# Patient Record
Sex: FEMALE | Race: WHITE | NOT HISPANIC OR LATINO | URBAN - METROPOLITAN AREA
[De-identification: names, ages, dates, MRNs, and addresses within clinical notes are randomized per-mention and may not be internally consistent; named-entity substitution may affect disease eponyms.]

---

## 2017-01-13 ENCOUNTER — COMMUNICATION - HEALTHEAST (OUTPATIENT)
Dept: INTERNAL MEDICINE | Facility: CLINIC | Age: 62
End: 2017-01-13

## 2017-01-13 DIAGNOSIS — I10 HYPERTENSION: ICD-10-CM

## 2017-01-14 ENCOUNTER — RECORDS - HEALTHEAST (OUTPATIENT)
Dept: ADMINISTRATIVE | Facility: OTHER | Age: 62
End: 2017-01-14

## 2017-04-13 ENCOUNTER — COMMUNICATION - HEALTHEAST (OUTPATIENT)
Dept: SCHEDULING | Facility: CLINIC | Age: 62
End: 2017-04-13

## 2017-05-31 ENCOUNTER — COMMUNICATION - HEALTHEAST (OUTPATIENT)
Dept: SCHEDULING | Facility: CLINIC | Age: 62
End: 2017-05-31

## 2017-05-31 ENCOUNTER — RECORDS - HEALTHEAST (OUTPATIENT)
Dept: ADMINISTRATIVE | Facility: OTHER | Age: 62
End: 2017-05-31

## 2017-06-08 ENCOUNTER — OFFICE VISIT - HEALTHEAST (OUTPATIENT)
Dept: INTERNAL MEDICINE | Facility: CLINIC | Age: 62
End: 2017-06-08

## 2017-06-08 DIAGNOSIS — I10 HTN (HYPERTENSION): ICD-10-CM

## 2017-06-08 DIAGNOSIS — M25.572 BILATERAL ANKLE PAIN: ICD-10-CM

## 2017-06-08 DIAGNOSIS — D64.9 ANEMIA: ICD-10-CM

## 2017-06-08 DIAGNOSIS — M25.571 BILATERAL ANKLE PAIN: ICD-10-CM

## 2017-06-08 DIAGNOSIS — R53.83 FATIGUE: ICD-10-CM

## 2017-06-08 DIAGNOSIS — E78.5 HYPERLIPIDEMIA: ICD-10-CM

## 2017-06-09 ENCOUNTER — COMMUNICATION - HEALTHEAST (OUTPATIENT)
Dept: INTERNAL MEDICINE | Facility: CLINIC | Age: 62
End: 2017-06-09

## 2017-06-28 ENCOUNTER — RECORDS - HEALTHEAST (OUTPATIENT)
Dept: ADMINISTRATIVE | Facility: OTHER | Age: 62
End: 2017-06-28

## 2017-08-28 ENCOUNTER — RECORDS - HEALTHEAST (OUTPATIENT)
Dept: ADMINISTRATIVE | Facility: OTHER | Age: 62
End: 2017-08-28

## 2017-08-28 ENCOUNTER — COMMUNICATION - HEALTHEAST (OUTPATIENT)
Dept: INTERNAL MEDICINE | Facility: CLINIC | Age: 62
End: 2017-08-28

## 2017-08-28 DIAGNOSIS — R94.6 ABNORMAL THYROID FUNCTION TEST: ICD-10-CM

## 2017-08-30 ENCOUNTER — AMBULATORY - HEALTHEAST (OUTPATIENT)
Dept: LAB | Facility: CLINIC | Age: 62
End: 2017-08-30

## 2017-08-30 DIAGNOSIS — R94.6 ABNORMAL THYROID FUNCTION TEST: ICD-10-CM

## 2017-09-11 ENCOUNTER — RECORDS - HEALTHEAST (OUTPATIENT)
Dept: ADMINISTRATIVE | Facility: OTHER | Age: 62
End: 2017-09-11

## 2017-10-14 ENCOUNTER — COMMUNICATION - HEALTHEAST (OUTPATIENT)
Dept: INTERNAL MEDICINE | Facility: CLINIC | Age: 62
End: 2017-10-14

## 2017-10-14 DIAGNOSIS — I10 HYPERTENSION: ICD-10-CM

## 2017-12-26 ENCOUNTER — OFFICE VISIT - HEALTHEAST (OUTPATIENT)
Dept: INTERNAL MEDICINE | Facility: CLINIC | Age: 62
End: 2017-12-26

## 2017-12-26 ENCOUNTER — COMMUNICATION - HEALTHEAST (OUTPATIENT)
Dept: INTERNAL MEDICINE | Facility: CLINIC | Age: 62
End: 2017-12-26

## 2017-12-26 DIAGNOSIS — I10 HYPERTENSION: ICD-10-CM

## 2017-12-26 DIAGNOSIS — Z01.00 EYE EXAM, ROUTINE: ICD-10-CM

## 2017-12-26 DIAGNOSIS — J32.1 CHRONIC FRONTAL SINUSITIS: ICD-10-CM

## 2017-12-26 DIAGNOSIS — K50.90 CROHN'S DISEASE WITHOUT COMPLICATION, UNSPECIFIED GASTROINTESTINAL TRACT LOCATION (H): ICD-10-CM

## 2017-12-26 DIAGNOSIS — E78.5 HYPERLIPIDEMIA: ICD-10-CM

## 2017-12-26 DIAGNOSIS — D48.5 NEOPLASM OF UNCERTAIN BEHAVIOR OF SKIN: ICD-10-CM

## 2017-12-26 DIAGNOSIS — Z00.00 ROUTINE GENERAL MEDICAL EXAMINATION AT A HEALTH CARE FACILITY: ICD-10-CM

## 2017-12-26 DIAGNOSIS — Z12.31 SCREENING MAMMOGRAM, ENCOUNTER FOR: ICD-10-CM

## 2017-12-26 LAB
CHOLEST SERPL-MCNC: 261 MG/DL
FASTING STATUS PATIENT QL REPORTED: YES
HDLC SERPL-MCNC: 44 MG/DL
LDLC SERPL CALC-MCNC: 180 MG/DL
TRIGL SERPL-MCNC: 184 MG/DL

## 2017-12-26 ASSESSMENT — MIFFLIN-ST. JEOR: SCORE: 1280.81

## 2018-01-15 ENCOUNTER — RECORDS - HEALTHEAST (OUTPATIENT)
Dept: ADMINISTRATIVE | Facility: OTHER | Age: 63
End: 2018-01-15

## 2018-01-15 ENCOUNTER — HOSPITAL ENCOUNTER (OUTPATIENT)
Dept: MAMMOGRAPHY | Facility: CLINIC | Age: 63
Discharge: HOME | End: 2018-01-15
Attending: INTERNAL MEDICINE

## 2018-01-15 DIAGNOSIS — Z12.31 SCREENING MAMMOGRAM, ENCOUNTER FOR: ICD-10-CM

## 2018-01-22 ENCOUNTER — COMMUNICATION - HEALTHEAST (OUTPATIENT)
Dept: FAMILY MEDICINE | Facility: CLINIC | Age: 63
End: 2018-01-22

## 2018-01-22 ENCOUNTER — COMMUNICATION - HEALTHEAST (OUTPATIENT)
Dept: INTERNAL MEDICINE | Facility: CLINIC | Age: 63
End: 2018-01-22

## 2018-01-22 DIAGNOSIS — K50.90 CROHN'S DISEASE (H): ICD-10-CM

## 2018-01-24 ENCOUNTER — RECORDS - HEALTHEAST (OUTPATIENT)
Dept: ADMINISTRATIVE | Facility: OTHER | Age: 63
End: 2018-01-24

## 2018-01-30 ENCOUNTER — COMMUNICATION - HEALTHEAST (OUTPATIENT)
Dept: INTERNAL MEDICINE | Facility: CLINIC | Age: 63
End: 2018-01-30

## 2018-02-12 ENCOUNTER — RECORDS - HEALTHEAST (OUTPATIENT)
Dept: ADMINISTRATIVE | Facility: OTHER | Age: 63
End: 2018-02-12

## 2018-04-05 ENCOUNTER — RECORDS - HEALTHEAST (OUTPATIENT)
Dept: ADMINISTRATIVE | Facility: OTHER | Age: 63
End: 2018-04-05

## 2018-04-16 ENCOUNTER — RECORDS - HEALTHEAST (OUTPATIENT)
Dept: ADMINISTRATIVE | Facility: OTHER | Age: 63
End: 2018-04-16

## 2018-04-17 ENCOUNTER — RECORDS - HEALTHEAST (OUTPATIENT)
Dept: ADMINISTRATIVE | Facility: OTHER | Age: 63
End: 2018-04-17

## 2018-05-15 ENCOUNTER — RECORDS - HEALTHEAST (OUTPATIENT)
Dept: ADMINISTRATIVE | Facility: OTHER | Age: 63
End: 2018-05-15

## 2018-08-20 ENCOUNTER — RECORDS - HEALTHEAST (OUTPATIENT)
Dept: ADMINISTRATIVE | Facility: OTHER | Age: 63
End: 2018-08-20

## 2018-10-31 ENCOUNTER — RECORDS - HEALTHEAST (OUTPATIENT)
Dept: ADMINISTRATIVE | Facility: OTHER | Age: 63
End: 2018-10-31

## 2018-11-05 ENCOUNTER — RECORDS - HEALTHEAST (OUTPATIENT)
Dept: ADMINISTRATIVE | Facility: OTHER | Age: 63
End: 2018-11-05

## 2018-12-20 ENCOUNTER — RECORDS - HEALTHEAST (OUTPATIENT)
Dept: ADMINISTRATIVE | Facility: OTHER | Age: 63
End: 2018-12-20

## 2018-12-21 ENCOUNTER — RECORDS - HEALTHEAST (OUTPATIENT)
Dept: ADMINISTRATIVE | Facility: OTHER | Age: 63
End: 2018-12-21

## 2019-01-06 ENCOUNTER — COMMUNICATION - HEALTHEAST (OUTPATIENT)
Dept: INTERNAL MEDICINE | Facility: CLINIC | Age: 64
End: 2019-01-06

## 2019-01-06 DIAGNOSIS — I10 HYPERTENSION: ICD-10-CM

## 2019-02-25 ENCOUNTER — RECORDS - HEALTHEAST (OUTPATIENT)
Dept: ADMINISTRATIVE | Facility: OTHER | Age: 64
End: 2019-02-25

## 2019-02-27 ENCOUNTER — RECORDS - HEALTHEAST (OUTPATIENT)
Dept: ADMINISTRATIVE | Facility: OTHER | Age: 64
End: 2019-02-27

## 2019-04-03 ENCOUNTER — COMMUNICATION - HEALTHEAST (OUTPATIENT)
Dept: INTERNAL MEDICINE | Facility: CLINIC | Age: 64
End: 2019-04-03

## 2019-04-03 DIAGNOSIS — I10 HYPERTENSION: ICD-10-CM

## 2019-04-15 ENCOUNTER — RECORDS - HEALTHEAST (OUTPATIENT)
Dept: ADMINISTRATIVE | Facility: OTHER | Age: 64
End: 2019-04-15

## 2019-05-21 ENCOUNTER — OFFICE VISIT - HEALTHEAST (OUTPATIENT)
Dept: FAMILY MEDICINE | Facility: CLINIC | Age: 64
End: 2019-05-21

## 2019-05-21 DIAGNOSIS — E78.5 HYPERLIPIDEMIA, UNSPECIFIED HYPERLIPIDEMIA TYPE: ICD-10-CM

## 2019-05-21 DIAGNOSIS — M94.9 DISORDER OF BONE AND CARTILAGE: ICD-10-CM

## 2019-05-21 DIAGNOSIS — Z12.31 VISIT FOR SCREENING MAMMOGRAM: ICD-10-CM

## 2019-05-21 DIAGNOSIS — I10 HYPERTENSION: ICD-10-CM

## 2019-05-21 DIAGNOSIS — Z00.00 ROUTINE HEALTH MAINTENANCE: ICD-10-CM

## 2019-05-21 DIAGNOSIS — K50.90 CROHN'S DISEASE WITHOUT COMPLICATION, UNSPECIFIED GASTROINTESTINAL TRACT LOCATION (H): ICD-10-CM

## 2019-05-21 DIAGNOSIS — M89.9 DISORDER OF BONE AND CARTILAGE: ICD-10-CM

## 2019-05-21 ASSESSMENT — MIFFLIN-ST. JEOR: SCORE: 1278.2

## 2019-05-22 ENCOUNTER — AMBULATORY - HEALTHEAST (OUTPATIENT)
Dept: LAB | Facility: CLINIC | Age: 64
End: 2019-05-22

## 2019-05-22 DIAGNOSIS — M94.9 DISORDER OF BONE AND CARTILAGE: ICD-10-CM

## 2019-05-22 DIAGNOSIS — I10 HYPERTENSION: ICD-10-CM

## 2019-05-22 DIAGNOSIS — M89.9 DISORDER OF BONE AND CARTILAGE: ICD-10-CM

## 2019-05-22 DIAGNOSIS — K50.90 CROHN'S DISEASE WITHOUT COMPLICATION, UNSPECIFIED GASTROINTESTINAL TRACT LOCATION (H): ICD-10-CM

## 2019-05-22 DIAGNOSIS — E78.5 HYPERLIPIDEMIA, UNSPECIFIED HYPERLIPIDEMIA TYPE: ICD-10-CM

## 2019-05-22 LAB
ALBUMIN SERPL-MCNC: 4.1 G/DL (ref 3.5–5)
ALP SERPL-CCNC: 39 U/L (ref 45–120)
ALT SERPL W P-5'-P-CCNC: 33 U/L (ref 0–45)
ANION GAP SERPL CALCULATED.3IONS-SCNC: 15 MMOL/L (ref 5–18)
AST SERPL W P-5'-P-CCNC: 30 U/L (ref 0–40)
BILIRUB SERPL-MCNC: 0.4 MG/DL (ref 0–1)
BUN SERPL-MCNC: 16 MG/DL (ref 8–22)
CALCIUM SERPL-MCNC: 9.7 MG/DL (ref 8.5–10.5)
CHLORIDE BLD-SCNC: 99 MMOL/L (ref 98–107)
CHOLEST SERPL-MCNC: 281 MG/DL
CO2 SERPL-SCNC: 27 MMOL/L (ref 22–31)
CREAT SERPL-MCNC: 0.77 MG/DL (ref 0.6–1.1)
ERYTHROCYTE [DISTWIDTH] IN BLOOD BY AUTOMATED COUNT: 10.9 % (ref 11–14.5)
FASTING STATUS PATIENT QL REPORTED: YES
GFR SERPL CREATININE-BSD FRML MDRD: >60 ML/MIN/1.73M2
GLUCOSE BLD-MCNC: 106 MG/DL (ref 70–125)
HCT VFR BLD AUTO: 39.8 % (ref 35–47)
HDLC SERPL-MCNC: 44 MG/DL
HGB BLD-MCNC: 13.3 G/DL (ref 12–16)
LDLC SERPL CALC-MCNC: 185 MG/DL
MCH RBC QN AUTO: 30.4 PG (ref 27–34)
MCHC RBC AUTO-ENTMCNC: 33.3 G/DL (ref 32–36)
MCV RBC AUTO: 91 FL (ref 80–100)
PLATELET # BLD AUTO: 275 THOU/UL (ref 140–440)
PMV BLD AUTO: 7.3 FL (ref 7–10)
POTASSIUM BLD-SCNC: 4.1 MMOL/L (ref 3.5–5)
PROT SERPL-MCNC: 8.2 G/DL (ref 6–8)
RBC # BLD AUTO: 4.37 MILL/UL (ref 3.8–5.4)
SODIUM SERPL-SCNC: 141 MMOL/L (ref 136–145)
TRIGL SERPL-MCNC: 259 MG/DL
WBC: 6.6 THOU/UL (ref 4–11)

## 2019-05-23 LAB
25(OH)D3 SERPL-MCNC: 42.3 NG/ML (ref 30–80)
25(OH)D3 SERPL-MCNC: 42.3 NG/ML (ref 30–80)

## 2019-05-29 ENCOUNTER — COMMUNICATION - HEALTHEAST (OUTPATIENT)
Dept: FAMILY MEDICINE | Facility: CLINIC | Age: 64
End: 2019-05-29

## 2019-05-29 ENCOUNTER — RECORDS - HEALTHEAST (OUTPATIENT)
Dept: ADMINISTRATIVE | Facility: OTHER | Age: 64
End: 2019-05-29

## 2019-06-10 ENCOUNTER — HOSPITAL ENCOUNTER (OUTPATIENT)
Dept: CT IMAGING | Facility: CLINIC | Age: 64
Discharge: HOME | End: 2019-06-10

## 2019-06-10 DIAGNOSIS — E78.5 HYPERLIPIDEMIA, UNSPECIFIED HYPERLIPIDEMIA TYPE: ICD-10-CM

## 2019-06-10 LAB
CV CALCIUM SCORE AGATSTON LM: 0
CV CALCIUM SCORING AGATSON LAD: 25
CV CALCIUM SCORING AGATSTON CX: 25
CV CALCIUM SCORING AGATSTON RCA: 0
CV CALCIUM SCORING AGATSTON TOTAL: 50

## 2019-06-11 ENCOUNTER — COMMUNICATION - HEALTHEAST (OUTPATIENT)
Dept: FAMILY MEDICINE | Facility: CLINIC | Age: 64
End: 2019-06-11

## 2019-07-03 ENCOUNTER — RECORDS - HEALTHEAST (OUTPATIENT)
Dept: ADMINISTRATIVE | Facility: OTHER | Age: 64
End: 2019-07-03

## 2019-07-10 ENCOUNTER — RECORDS - HEALTHEAST (OUTPATIENT)
Dept: ADMINISTRATIVE | Facility: OTHER | Age: 64
End: 2019-07-10

## 2019-07-10 ENCOUNTER — RECORDS - HEALTHEAST (OUTPATIENT)
Dept: BONE DENSITY | Facility: CLINIC | Age: 64
End: 2019-07-10

## 2019-07-10 ENCOUNTER — HOSPITAL ENCOUNTER (OUTPATIENT)
Dept: MAMMOGRAPHY | Facility: CLINIC | Age: 64
Discharge: HOME | End: 2019-07-10

## 2019-07-10 DIAGNOSIS — M89.9 DISORDER OF BONE, UNSPECIFIED: ICD-10-CM

## 2019-07-10 DIAGNOSIS — M94.9 DISORDER OF CARTILAGE, UNSPECIFIED: ICD-10-CM

## 2019-07-10 DIAGNOSIS — Z12.31 VISIT FOR SCREENING MAMMOGRAM: ICD-10-CM

## 2019-08-07 ENCOUNTER — RECORDS - HEALTHEAST (OUTPATIENT)
Dept: ADMINISTRATIVE | Facility: OTHER | Age: 64
End: 2019-08-07

## 2019-08-08 ENCOUNTER — RECORDS - HEALTHEAST (OUTPATIENT)
Dept: ADMINISTRATIVE | Facility: OTHER | Age: 64
End: 2019-08-08

## 2019-08-28 ENCOUNTER — RECORDS - HEALTHEAST (OUTPATIENT)
Dept: ADMINISTRATIVE | Facility: OTHER | Age: 64
End: 2019-08-28

## 2019-10-23 ENCOUNTER — RECORDS - HEALTHEAST (OUTPATIENT)
Dept: ADMINISTRATIVE | Facility: OTHER | Age: 64
End: 2019-10-23

## 2019-11-06 ENCOUNTER — RECORDS - HEALTHEAST (OUTPATIENT)
Dept: ADMINISTRATIVE | Facility: OTHER | Age: 64
End: 2019-11-06

## 2019-12-30 ENCOUNTER — RECORDS - HEALTHEAST (OUTPATIENT)
Dept: ADMINISTRATIVE | Facility: OTHER | Age: 64
End: 2019-12-30

## 2020-02-12 ENCOUNTER — RECORDS - HEALTHEAST (OUTPATIENT)
Dept: ADMINISTRATIVE | Facility: OTHER | Age: 65
End: 2020-02-12

## 2020-03-18 ENCOUNTER — RECORDS - HEALTHEAST (OUTPATIENT)
Dept: ADMINISTRATIVE | Facility: OTHER | Age: 65
End: 2020-03-18

## 2020-05-02 ENCOUNTER — COMMUNICATION - HEALTHEAST (OUTPATIENT)
Dept: FAMILY MEDICINE | Facility: CLINIC | Age: 65
End: 2020-05-02

## 2020-05-02 DIAGNOSIS — I10 HYPERTENSION: ICD-10-CM

## 2020-06-03 ENCOUNTER — RECORDS - HEALTHEAST (OUTPATIENT)
Dept: ADMINISTRATIVE | Facility: OTHER | Age: 65
End: 2020-06-03

## 2020-06-11 ENCOUNTER — OFFICE VISIT - HEALTHEAST (OUTPATIENT)
Dept: FAMILY MEDICINE | Facility: CLINIC | Age: 65
End: 2020-06-11

## 2020-06-11 DIAGNOSIS — I10 HYPERTENSION: ICD-10-CM

## 2020-06-11 DIAGNOSIS — K50.90 CROHN'S DISEASE WITHOUT COMPLICATION, UNSPECIFIED GASTROINTESTINAL TRACT LOCATION (H): ICD-10-CM

## 2020-06-11 RX ORDER — USTEKINUMAB 90 MG/ML
INJECTION, SOLUTION SUBCUTANEOUS
Status: SHIPPED | COMMUNITY
Start: 2020-06-11

## 2020-06-11 RX ORDER — CHLORTHALIDONE 25 MG/1
12.5 TABLET ORAL DAILY
Qty: 45 TABLET | Refills: 3 | Status: SHIPPED | OUTPATIENT
Start: 2020-06-11

## 2020-06-11 RX ORDER — LISINOPRIL 20 MG/1
20 TABLET ORAL DAILY
Qty: 90 TABLET | Refills: 3 | Status: SHIPPED | OUTPATIENT
Start: 2020-06-11

## 2020-06-17 ENCOUNTER — RECORDS - HEALTHEAST (OUTPATIENT)
Dept: ADMINISTRATIVE | Facility: OTHER | Age: 65
End: 2020-06-17

## 2020-06-29 ENCOUNTER — AMBULATORY - HEALTHEAST (OUTPATIENT)
Dept: LAB | Facility: CLINIC | Age: 65
End: 2020-06-29

## 2020-06-29 DIAGNOSIS — I10 HYPERTENSION: ICD-10-CM

## 2020-06-29 LAB
ANION GAP SERPL CALCULATED.3IONS-SCNC: 14 MMOL/L (ref 5–18)
BUN SERPL-MCNC: 17 MG/DL (ref 8–22)
CALCIUM SERPL-MCNC: 9.6 MG/DL (ref 8.5–10.5)
CHLORIDE BLD-SCNC: 94 MMOL/L (ref 98–107)
CO2 SERPL-SCNC: 29 MMOL/L (ref 22–31)
CREAT SERPL-MCNC: 0.78 MG/DL (ref 0.6–1.1)
GFR SERPL CREATININE-BSD FRML MDRD: >60 ML/MIN/1.73M2
GLUCOSE BLD-MCNC: 116 MG/DL (ref 70–125)
MAGNESIUM SERPL-MCNC: 1.8 MG/DL (ref 1.8–2.6)
POTASSIUM BLD-SCNC: 3.8 MMOL/L (ref 3.5–5)
SODIUM SERPL-SCNC: 137 MMOL/L (ref 136–145)

## 2020-08-25 ENCOUNTER — COMMUNICATION - HEALTHEAST (OUTPATIENT)
Dept: FAMILY MEDICINE | Facility: CLINIC | Age: 65
End: 2020-08-25

## 2020-12-21 ENCOUNTER — RECORDS - HEALTHEAST (OUTPATIENT)
Dept: ADMINISTRATIVE | Facility: OTHER | Age: 65
End: 2020-12-21

## 2021-05-27 NOTE — TELEPHONE ENCOUNTER
RN cannot approve Refill Request    RN can NOT refill this medication overdue for office visits and/or labs.    Won Akhtar, [facility] Triage/Med Refill 4/4/2019    Requested Prescriptions   Pending Prescriptions Disp Refills     lisinopril (PRINIVIL,ZESTRIL) 20 MG tablet [Pharmacy Med Name: LISINOPRIL 20 MG TABLET] 90 tablet 0     Sig: TAKE 1 TABLET BY MOUTH EVERY DAY    Ace Inhibitors Refill Protocol Failed - 4/3/2019 10:37 AM       Failed - PCP or prescribing provider visit in past 12 months      Last office visit with prescriber/PCP: 6/8/2017 Siri Dunn MD OR same dept: Visit date not found OR same specialty: 6/8/2017 Siri Dunn MD  Last physical: 12/26/2017 Last MTM visit: Visit date not found   Next visit within 3 mo: Visit date not found  Next physical within 3 mo: Visit date not found  Prescriber OR PCP: Siri Dunn MD  Last diagnosis associated with med order: 1. Hypertension  - lisinopril (PRINIVIL,ZESTRIL) 20 MG tablet [Pharmacy Med Name: LISINOPRIL 20 MG TABLET]; TAKE 1 TABLET BY MOUTH EVERY DAY  Dispense: 90 tablet; Refill: 0    If protocol passes may refill for 12 months if within 3 months of last provider visit (or a total of 15 months).            Failed - Serum Potassium in past 12 months    No results found for: LN-POTASSIUM         Failed - Blood pressure filed in past 12 months    BP Readings from Last 1 Encounters:   12/26/17 130/80            Failed - Serum Creatinine in past 12 months    Creatinine   Date Value Ref Range Status   12/26/2017 0.73 0.60 - 1.10 mg/dL Final

## 2021-05-29 ENCOUNTER — RECORDS - HEALTHEAST (OUTPATIENT)
Dept: ADMINISTRATIVE | Facility: CLINIC | Age: 66
End: 2021-05-29

## 2021-05-29 NOTE — PROGRESS NOTES
FEMALE PREVENTATIVE EXAM    Assessment and Plan:       1. Routine health maintenance    2. Hypertension  Well controlled.  Continue current medications.  CMP pending.   - lisinopril (PRINIVIL,ZESTRIL) 20 MG tablet; Take 1 tablet (20 mg total) by mouth daily.  Dispense: 90 tablet; Refill: 3  - chlorthalidone (HYGROTEN) 25 MG tablet; Take 0.5 tablets (12.5 mg total) by mouth daily.  Dispense: 45 tablet; Refill: 3  - Comprehensive Metabolic Panel; Future    3. Hyperlipidemia, unspecified hyperlipidemia type  Did not tolerate Lipitor secondary to leg cramps.  Repeat lipid panel and CT calcium score as it has been 3 years.  Will base treatment off of results.  May trial her on a different statin.   - Lipid [place] FASTING; Future  - CT Cardiac Calcium Score; Future    4. Osteopenia  Repeat Dexa scan.  - DXA Bone Density Scan; Future  - Vitamin D, Total (25-Hydroxy); Future    5. Crohn's disease without complication, unspecified gastrointestinal tract location (H)  Current flare.  Following with GI.  May be changing medication.  - HM2(CBC w/o Differential); Future    6. Visit for screening mammogram  - Mammo Screening Bilateral; Future     Next follow up:  Return in about 1 year (around 5/21/2020) for Physical.    Immunization Review  Adult Imm Review: No immunizations due today Patient will check with her insurance and gastroenterologist regarding the Shingrix vaccination.    I discussed the following with the patient:   Adult Healthy Living: Importance of regular exercise  Healthy nutrition    I have had an Advance Directives discussion with the patient.    Subjective:   Chief Complaint: Natasha Bhatt is an 64 y.o. female here for a preventative health visit.     HPI: Patient is  with 2 adult children and 2 grandchildren.  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  Previously followed with Dr. Siri Dunn and internal medicine.    Patient had a total hysterectomy in 2003 due to pelvic pain and uterine  fibroids.  After her surgery, she continued to have abdominal pain.  Shortly after, she was diagnosed with Crohn's disease.  She has been on Humira for the past 12 years or so.  She is currently experiencing a flare.  She is being treated with an Entocort.  Patient has a follow-up with her gastroenterologist next week, and they have discussed discontinuing her Humira and starting her on Stelara.    History of hypertension.  She is currently on lisinopril and chlorthalidone.  Patient is not checking blood pressures at home.  Denies any chest pain, shortness of breath, headaches, or lightheadedness/dizziness.    History of hyperlipidemia with elevated CT calcium score.  Patient was on Lipitor for about 2 weeks, but this caused leg cramps and was told to discontinue it.  The leg cramps resolved.  She is due for a repeat CT calcium score.  The patient discontinued her daily aspirin secondary to her GI symptoms.    History of osteopenia.  Last DEXA scan was about 3 years ago.  Patient is on a calcium and vitamin D supplement.  She would like to be more active, however her current Crohn's symptoms are not helping.    Healthy Habits  Are you taking a daily aspirin? No  Do you typically exercising at least 40 min, 3-4 times per week?  NO  Do you usually eat at least 4 servings of fruit and vegetables a day, include whole grains and fiber and avoid regularly eating high fat foods? Yes  Have you had an eye exam in the past two years? Yes  Do you see a dentist twice per year? Yes  Do you have any concerns regarding sleep? No    Safety Screen  If you own firearms, are they secured in a locked gun cabinet or with trigger locks? Yes  Do you feel you are safe where you are living?: Yes (5/21/2019  1:02 PM)  Do you feel you are safe in your relationship(s)?: Yes (5/21/2019  1:02 PM)      Review of Systems:  Please see above.  The rest of the review of systems are negative for all systems.     Pap History:   Status post benign  "hysterectomy. Health Maintenance and Surgical History updated.  Cancer Screening       Status Date      MAMMOGRAM Next Due 1/15/2020      Done 1/15/2018 MAMMO SCREENING BILATERAL     Patient has more history with this topic...    COLONOSCOPY Next Due 4/16/2020      Done 4/16/2018 COLONOSCOPY EXTERNAL RESULT     Patient has more history with this topic...          Patient Care Team:  Johanny Cheng NP as PCP - General (Family Medicine)        History     Reviewed By Date/Time Sections Reviewed    Johanny Cheng NP 5/21/2019  1:12 PM Tobacco    Johanny Cheng NP 5/21/2019  1:07 PM Tobacco            Objective:   Vital Signs:   Visit Vitals  /80   Pulse 60   Temp 98.1  F (36.7  C)   Ht 5' 2.5\" (1.588 m)   Wt 171 lb 4.8 oz (77.7 kg)   BMI 30.83 kg/m           PHYSICAL EXAM  General Appearance: Alert, cooperative, no distress, appears stated age  Head: Normocephalic, without obvious abnormality, atraumatic  Eyes: PERRL, conjunctiva/corneas clear, EOM's intact  Ears: Normal TM's and external ear canals, both ears  Nose: Nares normal, septum midline  Throat: Lips, mucosa, and tongue normal; teeth and gums normal  Neck: Supple, symmetrical, trachea midline, no adenopathy; thyroid: not enlarged, symmetric, no tenderness/mass/nodules; no carotid bruit or JVD  Back: no CVA tenderness  Lungs: Clear to auscultation bilaterally, respirations unlabored  Breasts: No breast masses, tenderness, asymmetry, or nipple discharge.  Heart: Regular rate and rhythm, S1 and S2 normal, no murmur, rub, or gallop  Abdomen: Soft, non-tender, bowel sounds active all four quadrants,  no masses, no organomegaly  Extremities: Extremities normal, atraumatic, no cyanosis or edema  Skin: Skin color, texture, turgor normal, no rashes or lesions  Lymph nodes: Cervical, supraclavicular, and axillary nodes normal  Neurologic: Normal   Psychologic: appropriate affective, answers all of my questions appropriately. No hallucinations, delusion, " or suicidal ideations.    Johanny Cheng, NP

## 2021-05-31 VITALS — BODY MASS INDEX: 30.76 KG/M2 | WEIGHT: 168.19 LBS

## 2021-05-31 VITALS — WEIGHT: 171 LBS | BODY MASS INDEX: 30.3 KG/M2 | HEIGHT: 63 IN

## 2021-06-03 ENCOUNTER — RECORDS - HEALTHEAST (OUTPATIENT)
Dept: ADMINISTRATIVE | Facility: CLINIC | Age: 66
End: 2021-06-03

## 2021-06-03 VITALS — HEIGHT: 63 IN | BODY MASS INDEX: 30.35 KG/M2 | WEIGHT: 171.3 LBS

## 2021-06-04 VITALS — WEIGHT: 155 LBS | BODY MASS INDEX: 27.9 KG/M2 | SYSTOLIC BLOOD PRESSURE: 115 MMHG | DIASTOLIC BLOOD PRESSURE: 70 MMHG

## 2021-06-07 NOTE — TELEPHONE ENCOUNTER
Refill Approved    Rx renewed per Medication Renewal Policy. Medication was last renewed on 5/21/19.    Cherelle Jesus, [facility] Triage/Med Refill 5/4/2020     Requested Prescriptions   Pending Prescriptions Disp Refills     lisinopriL (PRINIVIL,ZESTRIL) 20 MG tablet [Pharmacy Med Name: LISINOPRIL 20 MG TABLET] 90 tablet 3     Sig: TAKE 1 TABLET BY MOUTH EVERY DAY       Ace Inhibitors Refill Protocol Passed - 5/2/2020  9:02 AM        Passed - PCP or prescribing provider visit in past 12 months       Last office visit with prescriber/PCP: Visit date not found OR same dept: Visit date not found OR same specialty: Visit date not found  Last physical: 5/21/2019 Last MTM visit: Visit date not found   Next visit within 3 mo: Visit date not found  Next physical within 3 mo: Visit date not found  Prescriber OR PCP: Johanny Cheng NP  Last diagnosis associated with med order: 1. Hypertension  - lisinopriL (PRINIVIL,ZESTRIL) 20 MG tablet [Pharmacy Med Name: LISINOPRIL 20 MG TABLET]; TAKE 1 TABLET BY MOUTH EVERY DAY  Dispense: 90 tablet; Refill: 3    If protocol passes may refill for 12 months if within 3 months of last provider visit (or a total of 15 months).             Passed - Serum Potassium in past 12 months     Lab Results   Component Value Date    Potassium 4.1 05/22/2019             Passed - Blood pressure filed in past 12 months     BP Readings from Last 1 Encounters:   05/21/19 124/80             Passed - Serum Creatinine in past 12 months     Creatinine   Date Value Ref Range Status   05/22/2019 0.77 0.60 - 1.10 mg/dL Final

## 2021-06-08 NOTE — PROGRESS NOTES
"Natasha Bhatt is a 65 y.o. female who is being evaluated via a billable telephone visit.      The patient has been notified of following:     \"This telephone visit will be conducted via a call between you and your physician/provider. We have found that certain health care needs can be provided without the need for a physical exam.  This service lets us provide the care you need with a short phone conversation.  If a prescription is necessary we can send it directly to your pharmacy.  If lab work is needed we can place an order for that and you can then stop by our lab to have the test done at a later time.    Telephone visits are billed at different rates depending on your insurance coverage. During this emergency period, for some insurers they may be billed the same as an in-person visit.  Please reach out to your insurance provider with any questions.    If during the course of the call the physician/provider feels a telephone visit is not appropriate, you will not be charged for this service.\"    Patient has given verbal consent to a Telephone visit? Yes    What phone number would you like to be contacted at? [phone]    Patient would like to receive their AVS by AVS Preference: [place].    Patient presents via telephone visit for medication refills.  Patient's house is currently on the market and she will be moving to [place] when this sells.    On Lisinopril and chlorthalidone for HTN.  Last reading at GI was 115/70.  She is not regularly checking BPs at home.  Denies at CP, shortness of breath, dizziness/lightheadeness, or LE edema.    History of Crohn's disease.  She was switched to Stelara this year from Humira.  She will be having a colonoscopy later this year.    No complaints or concerns today.      Assessment/Plan:  1. Hypertension  Well controlled per recent reading with GI.  Continue medication at current doses.  She will schedule a lab appointment.  Check a Mg due to leg cramps.  - Basic " Metabolic Panel; Future  - Magnesium; Future  - chlorthalidone (HYGROTEN) 25 MG tablet; Take 0.5 tablets (12.5 mg total) by mouth daily.  Dispense: 45 tablet; Refill: 3  - lisinopriL (PRINIVIL,ZESTRIL) 20 MG tablet; Take 1 tablet (20 mg total) by mouth daily.  Dispense: 90 tablet; Refill: 3    2. Crohn's disease without complication, unspecified gastrointestinal tract location (H)  Controlled with Stelara as managed by GI.  Anticipate colonoscopy later this year.        Phone call duration:  18 minutes  Start: 1255  End: 2926    Johanny Cheng NP

## 2021-06-11 NOTE — PROGRESS NOTES
ASSESSMENT and PLAN:  1. Anemia  Her Hgb dropped from 14 to 11.8.  We'll recheck.  No sx of bleeding.  - HM2(CBC w/o Differential)    2. Fatigue  She's tired, which could be related to her lower Hgb.  We'll check a TSH as well.  - Thyroid Cascade    3. HTN (hypertension)  Well controlled.  - Comprehensive Metabolic Panel    4. Hyperlipidemia  She's nervous about starting a statin and wants to see her liver tests and d/w GI first.  - Comprehensive Metabolic Panel    5. Bilateral ankle pain  She feels pain in her ankles and was told that her leg muscles are weak.  - Ambulatory referral to PT/OT    Patient Instructions   Today's labs will be available on [facility].  If you aren't signed up, then we'll mail them out.  If anything needs more immediate follow up, we'll also call.    Try flonase nasal spray to see if that helps the morning cough.    Please call to set up an appointment at [facility] for physical therapy: [phone] OR if the balance group is in your network, let me know and I can re-do the order.    I'm fine with you waiting to see GI before you start your lipitor.    Take care!          I have reconciled all medications.; she's not a statin.    CHIEF COMPLAINT:  Chief Complaint   Patient presents with     Hospital Visit Follow Up     [facility] 5/31-6/1 for fainting       HISTORY OF PRESENT ILLNESS:  Natasha Bhatt is a 62 y.o. female  presenting to the clinic today for a hospital follow up. she was admitted on 5/31/17 for Syncope and Fall. She was contacted after discharge on 6/1/17 and was advised to follow up with her PCP 14 days post discharge.    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 The class is a balance training class. During her class she started to sweat profusely and feel abnormal before she lost consciousness. All that she was doing was standing and stretching with bands. At the time she did not feel ill. She went home to  change before heading to the hospital. She had an EKG and stress test performed all of which came back normal.  She wonders if it may have been caused by her potassium levels. She recalls her mother always having issues with her potassium levels and that she had multiple syncope episodes due to low potassium. She has felt well since coming home and also participated in her exercise class Monday and Wednesday.     Hyperlipidemia: She has not been taking the Lipitor. She would like her liver levels measured before starting the lisinopril because she is afraid that it will affect Chron's. Her most recent levels from 1/28/16 was 249 cholesterol, 114 triglycerides, 45 HDL, and 181 LDL.     Chron's Disease: She is clinical remission from her Crohn's disease. She only takes Humira. She regularly follows up with [facility].     REVIEW OF SYSTEMS:   She denies previous problems with anemia. She denies blood in her stools. She is getting adequate amounts of sleep. She still coughs up yellow phlegm. She denies post nasal drainage. She has been waking up from her sleep thirsty. She denies a bad taste in her mouth. She was told that she had a faint heart murmur. She has had vertigo before. She denies sore throat, however she will feel an intermittent mass in her throat. She was taking Zyrtec at night, but she discontinued them because they would make her feel very fatigued. She is unable to walk around barefoot without later experiencing pain. She is interested in physical therapy to help her strengthen her ankles. All other systems are negative.    TOBACCO USE:  History   Smoking Status     Never Smoker   Smokeless Tobacco     Not on file       VITALS:  Vitals:    06/08/17 0936   BP: 120/64   Patient Site: Right Arm   Pulse: 88   Weight: 168 lb 3 oz (76.3 kg)     Wt Readings from Last 3 Encounters:   06/08/17 168 lb 3 oz (76.3 kg)   06/01/17 167 lb 4.8 oz (75.9 kg)   01/28/16 163 lb 3.2 oz (74 kg)       PHYSICAL  EXAM:  Constitutional:  Reveals an alert, pleasant middle aged female.   Vitals:  Noted.   Ears: TM's normal bilaterally   Throat: Lips, mucosa, and tongue normal; teeth and gums normal   Lungs: Clear to auscultation bilaterally, respirations unlabored.   Heart: Regular rate and rhythm, S1 and S2 normal, no murmur, rub, or gallop,   Neurologic: Normal     ADDITIONAL HISTORY SUMMARIZED (2): Reviewed discharge summary from 6/1/17 regarding syncope. Reviewed [facility] note from 12/5/16 regarding Crohn's.  DECISION TO OBTAIN EXTRA INFORMATION (1): None.   RADIOLOGY TESTS (1): None.  LABS (1): Reviewed labs from 6/1/17. Ordered labs.   MEDICINE TESTS (1): None.  INDEPENDENT REVIEW (2 each): None.     The visit lasted a total of 16 minutes face to face with the patient. Over 50% of the time was spent counseling and educating the patient about syncope.    I, Paula Burgos, am scribing for and in the presence of, Dr. Siri Dunn.    I, Dr. Siri Dunn, personally performed the services described in this documentation, as scribed by Paula Burgos in my presence, and it is both accurate and complete.    MEDICATIONS:  Current Outpatient Prescriptions   Medication Sig Dispense Refill     acetaminophen (TYLENOL) 650 MG CR tablet Take 1,300 mg by mouth every 8 (eight) hours as needed for pain.       adalimumab (HUMIRA) 40 mg/0.8 mL injection Inject 40 mg under the skin every 14 (fourteen) days. Thursday       calcium-vitamin D (CALCIUM-VITAMIN D) 500 mg(1,250mg) -200 unit per tablet Take 1 tablet by mouth daily.        chlorthalidone (HYGROTEN) 25 MG tablet Take 0.5 tablets (12.5 mg total) by mouth daily. 135 tablet 0     cholecalciferol, vitamin D3, 1,000 unit tablet Take 2,000 Units by mouth daily.       cyanocobalamin (VITAMIN B-12) 1000 MCG tablet Take 1,000 mcg by mouth daily.       lisinopril (PRINIVIL,ZESTRIL) 20 MG tablet Take 1 tablet (20 mg total) by mouth daily. 90 tablet 3     atorvastatin (LIPITOR) 40 MG  tablet Take 1 tablet (40 mg total) by mouth bedtime. 90 tablet 3     No current facility-administered medications for this visit.        Total data points: 3

## 2021-06-15 PROBLEM — R55 SYNCOPE AND COLLAPSE: Status: ACTIVE | Noted: 2017-05-31

## 2021-06-15 NOTE — PROGRESS NOTES
ASSESSMENT and PLAN:  1. Hypertension  Her blood pressure is stable.  She is on lisinopril and needs a refill.  She is also on chlorthalidone.  Labs will be checked.  She will be looking into [facility] classes.  - lisinopril (PRINIVIL,ZESTRIL) 20 MG tablet; Take 1 tablet (20 mg total) by mouth daily.  Dispense: 90 tablet; Refill: 3  - Ambulatory referral to [facility]  - Comprehensive Metabolic Panel    2. Screening mammogram, encounter for  She is coming due for a 2 year follow-up and will schedule this  - Mammo Screening Bilateral; Future    3. Crohn's disease without complication, unspecified gastrointestinal tract location  She is going to be setting up an appointment with [facility].  - Ambulatory referral to [facility]    4. Hyperlipidemia  She has been prescribed atorvastatin but has never started it.  In part, this was because of concern with her GI issues.  Now that that stable, she would be open to a statin.  We reviewed her CT calcium score and it was elevated so again treatment would be recommended.  We will get blood work today.  - Ambulatory referral to [facility]  - Lipid Cascade    5. Routine general medical examination at a health care facility  She will update her mammogram soon.  Her colonoscopy is up-to-date.  She would like to get in for routine eye exam and a referral is placed.    6. Neoplasm of uncertain behavior of skin  She is immunosuppressed and has a recurring lesion on her right forearm.  She would like to have this further evaluated and I agree.  I will give her referral to dermatology.  - Ambulatory referral to Dermatology    7. Eye exam, routine  She will schedule this.  - Ambulatory referral to Ophthalmology    8. Chronic frontal sinusitis  I am wondering about a chronic sinusitis.  If that is the case, we would be looking at a prolonged course of antibiotics.  I would like her to get a CT scan prior to committing her to a long course of treatment.  - CT  Sinuses Without Contrast; Future        Patient Instructions   Increase the oscal to twice daily; look at citracal as well--double check the serving size.  DEXA will be due in August of 2018.    [facility]  PHONE: [phone]    Please set up your mammogram:  [phone]    [facility]  PHONE: [phone]    To schedule the CT:  [phone]    Today's labs will be available on [employer].  If you aren't signed up, then we'll mail them out.  If anything needs more immediate follow up, we'll also call.        Orders Placed This Encounter   Procedures     Mammo Screening Bilateral     Standing Status:   Future     Standing Expiration Date:   3/26/2019     Order Specific Question:   Patient's previous breast density:     Answer:   Almost entirely fatty [1]     Order Specific Question:   Can the procedure be changed per Radiologist protocol?     Answer:   Yes     CT Sinuses Without Contrast     Standing Status:   Future     Standing Expiration Date:   12/27/2018     Scheduling Instructions:      SCHEDULING: Will patient schedule appointment? Yes     LOCATION:             LOCATIONS:        - [facility] ([facility]), PHONE: [phone]      - [facility] ([facility]), PHONE: [phone]     Order Specific Question:   Can the procedure be changed per Radiologist protocol?     Answer:   Yes     Lipid Cascade     Order Specific Question:   Fasting is required?     Answer:   Yes     Comprehensive Metabolic Panel     Ambulatory referral to [facility]     Referral Priority:   Routine     Referral Type:   Health Education     Referral Reason:   Evaluation and Treatment     Number of Visits Requested:   1     Ambulatory referral to Dermatology     Referral Priority:   Routine     Referral Type:   Consultation     Referral Reason:   Evaluation and Treatment     Requested Specialty:   Dermatology     Number of Visits Requested:   1     Ambulatory referral to Ophthalmology      Referral Priority:   Routine     Referral Type:   Consultation     Referral Reason:   Evaluation and Treatment     Requested Specialty:   Ophthalmology     Number of Visits Requested:   1     Medications Discontinued During This Encounter   Medication Reason     acetaminophen (TYLENOL) 325 MG tablet Duplicate order     lisinopril (PRINIVIL,ZESTRIL) 20 MG tablet Reorder       No Follow-up on file.    ASSESSED PROBLEMS:  Problem List Items Addressed This Visit     Crohn's Disease    Relevant Orders    Ambulatory referral to [facility]    Hyperlipidemia; CAC 42 in 2/2016    Relevant Orders    Ambulatory referral to [facility]    Lipid [place]      Other Visit Diagnoses     Routine general medical examination at a health care facility    -  Primary    Hypertension        Relevant Medications    lisinopril (PRINIVIL,ZESTRIL) 20 MG tablet    Other Relevant Orders    Ambulatory referral to [facility]    Comprehensive Metabolic Panel    Screening mammogram, encounter for        Relevant Orders    Mammo Screening Bilateral    Neoplasm of uncertain behavior of skin        Relevant Orders    Ambulatory referral to Dermatology    Eye exam, routine        Relevant Orders    Ambulatory referral to Ophthalmology    Chronic frontal sinusitis        Relevant Orders    CT Sinuses Without Contrast          CHIEF COMPLAINT:  Chief Complaint   Patient presents with     Annual Exam     fasting - bump on lower right fore arm, interested in [facility] - hasn't received dxa results        HISTORY OF PRESENT ILLNESS:  Natasha Bhatt is a 62 y.o. female is presenting to the clinic today for an annual exam.     She has a few concerns that she would like to discuss today.  Fortunately, her bowels are stable.  She continues on Humira 40 mg biweekly.  She had a colonoscopy that showed no evidence of disease currently.  She is very pleased with that.    She would like referrals for routine refraction for her eye exam as  well as a mammogram.  She would also like information on the [facility] program.    She is struggling right now with her sinuses.  At this has been a long-standing issue for her.  She has symptoms at night.  She feels pressure.  She has started using essential oils at bedtime.  Again, she is immunosuppressed on Humira.  She had her flu vaccine done this year at [facility].    She has a spot on her right forearm.  It has been there for about a year.  It started out as flaking skin that would brush off.  Then she noticed a divot there.  Since then, she has noticed a nodule in that area.    For her cholesterol, she has been prescribed atorvastatin but has never started it.  She was concerned that it would affect her GI tract more when that was flaring.  She is reluctant to start it but is open to that now.  Her CT calcium score was elevated when that was checked in 2016.  She would like to see what her cholesterol is today prior to committing to starting the atorvastatin.    Health Maintenance:  She has her eyes examined regularly and visits the dentist on a regular basis.   Mammogram: 12/28/15  Colonoscopy: 8/1/16  Pap Smear: JOSE ENRIQUE    REVIEW OF SYSTEMS:   She denies nipple discharge and drainage. CV: HTN  Aside from what's noted in HPI,  all other systems are negative.    PFSH:  Past Medical History:   Diagnosis Date     Crohn's colitis      High cholesterol      Hypertension      Past Surgical History:   Procedure Laterality Date     HYSTERECTOMY Bilateral 2003     MT HYSTEROSCOPY,W/ENDOMETRIAL ABLATION      Description: Hysteroscopy With Endometrial Ablation;  Recorded: 04/18/2008;     MT TOTAL ABDOM HYSTERECTOMY      Description: Hysterectomy;  Recorded: 04/18/2008;     MT VAGINAL HYSTERECTOMY,UTERUS 250 GMS/<      Description: Vaginal Hysterectomy;  Recorded: 06/12/2012;     Family History   Problem Relation Age of Onset     Breast cancer Maternal Aunt      diagnosed after menopause     Rheumatic Heart Disease Father  "     Social History     Social History     Marital status:      Spouse name: N/A     Number of children: N/A     Years of education: N/A     Occupational History     Not on file.     Social History Main Topics     Smoking status: Never Smoker     Smokeless tobacco: Not on file     Alcohol use Yes      Comment: Very little     Drug use: No     Sexual activity: Yes     Partners: Male     Other Topics Concern     Not on file     Social History Narrative       VITALS:  Vitals:    12/26/17 0918   BP: 130/80   Pulse: 84   Weight: 171 lb (77.6 kg)   Height: 5' 2.75\" (1.594 m)     Wt Readings from Last 3 Encounters:   12/26/17 171 lb (77.6 kg)   06/08/17 168 lb 3 oz (76.3 kg)   06/01/17 167 lb 4.8 oz (75.9 kg)       PHYSICAL EXAM:  Constitutional:  Reveals an alert, pleasant adult female.   Vitals:  Noted.   Head: Normocephalic, without obvious abnormality, atraumatic   Ears: TM's normal bilaterally   Eyes: PERRL, conjunctiva/corneas clear, EOM's intact   Throat: Lips, mucosa, and tongue normal; teeth and gums normal   Neck: Normal ROM, no carotid bruits, no thyromegaly   Lungs: Clear to auscultation bilaterally, respirations unlabored.   Breast exam:  Normal skin overlying her breasts bilaterally no discrete nodule is palpable in the axilla bilaterally  Heart: Regular rate and rhythm, S1 and S2 normal, no murmur, rub, or gallop,   Abdomen: Soft, non-tender, bowel sounds active all four quadrants, no masses, no organomegaly   Extremities: Extremities normal, atraumatic, no cyanosis or edema   Pelvic:Not examined  Skin: Skin color, texture, turgor normal, no rashes or lesions   Neurologic: Normal     MEDICATIONS:  Current Outpatient Prescriptions   Medication Sig Dispense Refill     acetaminophen (TYLENOL) 650 MG CR tablet Take 1,300 mg by mouth every 8 (eight) hours as needed for pain.       adalimumab (HUMIRA) 40 mg/0.8 mL injection Inject 40 mg under the skin every 14 (fourteen) days. Thursday       calcium-vitamin " D (CALCIUM-VITAMIN D) 500 mg(1,250mg) -200 unit per tablet Take 1 tablet by mouth daily.        chlorthalidone (HYGROTEN) 25 MG tablet TAKE 0.5 TABLETS (12.5 MG TOTAL) BY MOUTH DAILY. 135 tablet 1     cholecalciferol, vitamin D3, 1,000 unit tablet Take 2,000 Units by mouth daily.       cyanocobalamin (VITAMIN B-12) 1000 MCG tablet Take 1,000 mcg by mouth daily.       HUMIRA PEN 40 mg/0.8 mL PnKt        lisinopril (PRINIVIL,ZESTRIL) 20 MG tablet Take 1 tablet (20 mg total) by mouth daily. 90 tablet 3     atorvastatin (LIPITOR) 40 MG tablet Take 1 tablet (40 mg total) by mouth bedtime. 90 tablet 3     No current facility-administered medications for this visit.

## 2021-06-17 NOTE — PATIENT INSTRUCTIONS - HE
Patient Instructions by Johanny Cheng NP at 5/21/2019  1:00 PM     Author: Johanny Cheng NP Service: -- Author Type: Nurse Practitioner    Filed: 5/21/2019  1:04 PM Encounter Date: 5/21/2019 Status: Signed    : Johanny Cheng NP (Nurse Practitioner)       Patient Education     Prevention Guidelines, Women Ages 50 to 64  Screening tests and vaccines are an important part of managing your health. A screening test is done to find possible disorders or diseases in people who don't have any symptoms. The goal is to find a disease early so lifestyle changes can be made and you can be watched more closely to reduce the risk of disease, or to detect it early enough to treat it most effectively. Screening tests are not considered diagnostic, but are used to determine if more testing is needed. Health counseling is essential, too. Below are guidelines for these, for women ages 50 to 64. Talk with your healthcare provider to make sure youre up to date on what you need.  Screening Who needs it How often   Type 2 diabetes or prediabetes All women beginning at age 45 and women without symptoms at any age who are overweight or obese and have 1 or more additional risk factors for diabetes. At  least every 3 years   Type 2 diabetes or prediabetes All women diagnosed with gestational diabetes Lifelong testing every 3 years   Type 2 diabetes All women with prediabetes Every year   Alcohol misuse All women in this age group At routine exams   Blood pressure All women in this age group Yearly checkup if your blood pressure is normal  Normal blood pressure is less than 120/80 mm Hg  If your blood pressure reading is higher than normal, follow the advice of your healthcare provider   Breast cancer All women at average risk in this age group Yearly mammogram should be done until age 54. At age 55, switch to mammograms every other year or choose to continue yearly mammograms.  All women should be familiar with the potential  benefits and risks of breast cancer screening with mammograms.      Cervical cancer All women in this age group, except women who have had a complete hysterectomy Pap test every 3 years or Pap test with human papillomavirus (HPV) test every 5 years   Chlamydia Women at increased risk for infection At routine exams   Colorectal cancer All women in this age group Flexible sigmoidoscopy every 5 years, or colonoscopy every 10 years, or double-contrast barium enema every 5 years; yearly fecal occult blood test or fecal immunochemical test; or a stool DNA test as often as your health care provider advises; talk with your health care provider about which tests are best for you   Depression All women in this age group At routine exams   Gonorrhea Sexually active women at increased risk for infection At routine exams   Hepatitis C Anyone at increased risk; 1 time for those born between 1945 and 1965 At routine exams   High cholesterol or triglycerides All women in this age group who are at risk for coronary artery disease At least every 5 years   HIV All women At routine exams   Lung cancer Adults age 55 to 80 who have smoked Yearly screening in smokers with 30 pack-year history of smoking or who quit within 15 years   Obesity All women in this age group At routine exams   Osteoporosis Women who are postmenopausal Ask your healthcare provider   Syphilis Women at increased risk for infection - talk with your healthcare provider At routine exams   Tuberculosis Women at increased risk for infection - talk with your healthcare provider Ask your healthcare provider   Vision All women in this age group Ask your healthcare provider   Vaccine Who needs it How often   Chickenpox (varicella) All women in this age group who have no record of this infection or vaccine 2 doses; the second dose should be given at least 4 weeks after the first dose   Hepatitis A Women at increased risk for infection - talk with your healthcare provider 2  doses given at least 6 months apart   Hepatitis B Women at increased risk for infection - talk with your healthcare provider 3 doses over 6 months; second dose should be given 1 month after the first dose; the third dose should be given at least 2 months after the second dose and at least 4 months after the first dose   Haemophilus influenzaeType B (HIB) Women at increased risk for infection - talk with your healthcare provider 1 to 3 doses   Influenza (flu) All women in this age group Once a year   Measles, mumps, rubella (MMR) Women in this age group through their late 50s who have no record of these infections or vaccines 1 dose   Meningococcal Women at increased risk for infection - talk with your healthcare provider 1 or more doses   Pneumococcal conjugate vaccine (PCV13) and pneumococcal polysaccharide vaccine (PPSV23) Women at increased risk for infection - talk with your healthcare provider PCV13: 1 dose ages 19 to 65 (protects against 13 types of pneumococcal bacteria)  PPSV23: 1 to 2 doses through age 64, or 1 dose at 65 or older (protects against 23 types of pneumococcal bacteria)   Tetanus/diphtheria/pertussis (Td/Tdap) booster All women in this age group Td every 10 years, or a one-time dose of Tdap instead of a Td booster after age 18, then Td every 10 years   Zoster All women ages 60 and older 1 dose   Counseling Who needs it How often   BRCA gene mutation testing for breast and ovarian cancer susceptibility Women with increased risk for having gene mutation When your risk is known   Breast cancer and chemoprevention Women at high risk for breast cancer When your risk is known   Diet and exercise Women who are overweight or obese When diagnosed, and then at routine exams   Sexually transmitted infection prevention Women at increased risk for infection - talk with your healthcare provider At routine exams   Use of daily aspirin Women ages 55 and up in this age group who are at risk for cardiovascular  health problems such as stroke When your risk is known   Use of tobacco and the health effects it can cause All women in this age group Every exam   1[employer]  Date Last Reviewed: 1/26/2016 2000-2017 [employer]. [address] [place] [address], [place] [address]. All rights reserved. This information is not intended as a substitute for professional medical care. Always follow your healthcare professional's instructions.

## 2021-06-22 NOTE — TELEPHONE ENCOUNTER
RN cannot approve Refill Request    RN can NOT refill this medication PCP messaged that patient is overdue for Labs and Office Visit.       Cherelle Jesus, [facility] Triage/Med Refill 1/7/2019    Requested Prescriptions   Pending Prescriptions Disp Refills     chlorthalidone (HYGROTEN) 25 MG tablet [Pharmacy Med Name: CHLORTHALIDONE 25 MG TABLET] 135 tablet 0     Sig: TAKE 0.5 TABLETS (12.5 MG TOTAL) BY MOUTH DAILY.    Diuretics/Combination Diuretics Refill Protocol  Failed - 1/6/2019  8:59 AM       Failed - Visit with PCP or prescribing provider visit in past 12 months    Last office visit with prescriber/PCP: 6/8/2017 Siri Dunn MD OR same dept: Visit date not found OR same specialty: 6/8/2017 Siri Dunn MD  Last physical: 12/26/2017 Last MTM visit: Visit date not found   Next visit within 3 mo: Visit date not found  Next physical within 3 mo: Visit date not found  Prescriber OR PCP: Siri Dunn MD  Last diagnosis associated with med order: 1. Hypertension  - chlorthalidone (HYGROTEN) 25 MG tablet [Pharmacy Med Name: CHLORTHALIDONE 25 MG TABLET]; TAKE 0.5 TABLETS (12.5 MG TOTAL) BY MOUTH DAILY.  Dispense: 135 tablet; Refill: 0  - lisinopril (PRINIVIL,ZESTRIL) 20 MG tablet [Pharmacy Med Name: LISINOPRIL 20 MG TABLET]; Take 1 tablet (20 mg total) by mouth daily.  Dispense: 90 tablet; Refill: 3    If protocol passes may refill for 12 months if within 3 months of last provider visit (or a total of 15 months).            Failed - Serum Potassium in past 12 months     No results found for: LN-POTASSIUM         Failed - Serum Sodium in past 12 months     No results found for: LN-SODIUM         Failed - Blood pressure on file in past 12 months    BP Readings from Last 1 Encounters:   12/26/17 130/80            Failed - Serum Creatinine in past 12 months     Creatinine   Date Value Ref Range Status   12/26/2017 0.73 0.60 - 1.10 mg/dL Final             lisinopril (PRINIVIL,ZESTRIL) 20 MG tablet  [Pharmacy Med Name: LISINOPRIL 20 MG TABLET] 90 tablet 3     Sig: TAKE 1 TABLET (20 MG TOTAL) BY MOUTH DAILY.    Ace Inhibitors Refill Protocol Failed - 1/6/2019  8:59 AM       Failed - PCP or prescribing provider visit in past 12 months      Last office visit with prescriber/PCP: 6/8/2017 Siri Dunn MD OR same dept: Visit date not found OR same specialty: 6/8/2017 Siri Dunn MD  Last physical: 12/26/2017 Last MTM visit: Visit date not found   Next visit within 3 mo: Visit date not found  Next physical within 3 mo: Visit date not found  Prescriber OR PCP: Siri Dunn MD  Last diagnosis associated with med order: 1. Hypertension  - chlorthalidone (HYGROTEN) 25 MG tablet [Pharmacy Med Name: CHLORTHALIDONE 25 MG TABLET]; TAKE 0.5 TABLETS (12.5 MG TOTAL) BY MOUTH DAILY.  Dispense: 135 tablet; Refill: 0  - lisinopril (PRINIVIL,ZESTRIL) 20 MG tablet [Pharmacy Med Name: LISINOPRIL 20 MG TABLET]; Take 1 tablet (20 mg total) by mouth daily.  Dispense: 90 tablet; Refill: 3    If protocol passes may refill for 12 months if within 3 months of last provider visit (or a total of 15 months).            Failed - Serum Potassium in past 12 months    No results found for: LN-POTASSIUM         Failed - Blood pressure filed in past 12 months    BP Readings from Last 1 Encounters:   12/26/17 130/80            Failed - Serum Creatinine in past 12 months    Creatinine   Date Value Ref Range Status   12/26/2017 0.73 0.60 - 1.10 mg/dL Final

## 2021-06-27 ENCOUNTER — HEALTH MAINTENANCE LETTER (OUTPATIENT)
Age: 66
End: 2021-06-27

## 2021-07-13 ENCOUNTER — RECORDS - HEALTHEAST (OUTPATIENT)
Dept: ADMINISTRATIVE | Facility: CLINIC | Age: 66
End: 2021-07-13

## 2021-07-21 ENCOUNTER — RECORDS - HEALTHEAST (OUTPATIENT)
Dept: ADMINISTRATIVE | Facility: CLINIC | Age: 66
End: 2021-07-21

## 2021-10-17 ENCOUNTER — HEALTH MAINTENANCE LETTER (OUTPATIENT)
Age: 66
End: 2021-10-17

## 2022-07-23 ENCOUNTER — HEALTH MAINTENANCE LETTER (OUTPATIENT)
Age: 67
End: 2022-07-23

## 2022-10-01 ENCOUNTER — HEALTH MAINTENANCE LETTER (OUTPATIENT)
Age: 67
End: 2022-10-01

## 2023-08-06 ENCOUNTER — HEALTH MAINTENANCE LETTER (OUTPATIENT)
Age: 68
End: 2023-08-06

## 2023-12-24 ENCOUNTER — HEALTH MAINTENANCE LETTER (OUTPATIENT)
Age: 68
End: 2023-12-24